# Patient Record
Sex: MALE | Race: WHITE | URBAN - METROPOLITAN AREA
[De-identification: names, ages, dates, MRNs, and addresses within clinical notes are randomized per-mention and may not be internally consistent; named-entity substitution may affect disease eponyms.]

---

## 2019-02-27 ENCOUNTER — OFFICE VISIT (OUTPATIENT)
Dept: FAMILY MEDICINE CLINIC | Age: 39
End: 2019-02-27

## 2019-02-27 VITALS
BODY MASS INDEX: 23.14 KG/M2 | WEIGHT: 200 LBS | HEART RATE: 60 BPM | DIASTOLIC BLOOD PRESSURE: 79 MMHG | OXYGEN SATURATION: 98 % | HEIGHT: 78 IN | RESPIRATION RATE: 8 BRPM | TEMPERATURE: 97.7 F | SYSTOLIC BLOOD PRESSURE: 139 MMHG

## 2019-02-27 DIAGNOSIS — Z13.220 SCREENING FOR LIPID DISORDERS: ICD-10-CM

## 2019-02-27 DIAGNOSIS — R79.89 ELEVATED LFTS: ICD-10-CM

## 2019-02-27 DIAGNOSIS — R10.12 LEFT UPPER QUADRANT PAIN: Primary | ICD-10-CM

## 2019-02-27 NOTE — PROGRESS NOTES
Shyam Salcedo is a 45 y.o. male Patient states over 7 days ago he was experiencing sharp distress to upper left side of abdomen which lasted for 2 weeks. Patient states at that time he was also experiencing indigestion with burping. BM was different for patient at that time in which consisency was soft but tan in color. No problem at this time. Chief Complaint Patient presents with  New Patient [address] [place] [address]  Abdominal Pain  
  x 7 days ago, no complaints at this time 1. Have you been to the ER, urgent care clinic since your last visit? Hospitalized since your last visit? No 
M 
2. Have you seen or consulted any other health care providers outside of the [address] [place] [address] since your last visit? Include any pap smears or colon screening. No 
 
 
Visit Vitals /79 (BP 1 Location: Left arm, BP Patient Position: Sitting) Pulse 60 Temp 97.7 °F (36.5 °C) (Oral) Resp 8 Ht 6' 5.56\" (1.97 m) Wt 200 lb (90.7 kg) SpO2 98% BMI 23.38 kg/m² Health Maintenance Due Topic Date Due  
 DTaP/Tdap/Td series (1 - Tdap) 03/22/2001  Influenza Age 5 to Adult  08/01/2018 Medication Reconciliation completed, changes noted.   Please  Update medication list.

## 2019-02-27 NOTE — PROGRESS NOTES
Family Medicine Initial Office Visit Patient: Velia Desai 1980, 45 y.o., male Encounter Date: 2/27/2019 ASSESSMENT & PLAN 
  ICD-10-CM ICD-9-CM 1. Left upper quadrant pain R10.12 789.02 CBC W/O DIFF  
   METABOLIC PANEL, COMPREHENSIVE 2. Elevated LFTs R94.5 790.6 CBC W/O DIFF  
   METABOLIC PANEL, COMPREHENSIVE 3. Screening for lipid disorders Z13.220 V77.91 LIPID PANEL Orders Placed This Encounter  CBC W/O DIFF  
 LIPID PANEL  
 METABOLIC PANEL, COMPREHENSIVE  
 
LUQ pain no longer present, will check lft b/c hx of elevation in past per his report. DDX is broad -could be viral, gall bladder, gerd, but less likely pancreas (patient reassured)--will notify pt of lab results Encouraged healthy diet, exercise Anticipatory guidance Encouraged flu shot 
reocmmend be utd on tdap F/u annually or as needed CHIEF COMPLAINT Chief Complaint Patient presents with  New Patient [facility] Establish Care  Abdominal Pain  
  x 7 days ago, no complaints at this time SUBJECTIVE Velia Desai is a 45 y.o. male presenting today for establishing care. Patient works as unemployed presently. In the past has done [employer] of things. \" 
Patient lives in a house with mother and sister Patient was last seen by primary care about a year ago--was seeing a doctor at [facility] family medicine. Patient last saw a dentist not within the last few years Patient last had eye exam not within the last few years Exercise: cardio (20 m per day) and sometimes weights Diet / Weight:healthy diet, weight stable. Tobacco:no EtOH:no 
Illicit Substances:no Sexual Activity: no  
 
Had 1-2 wk of tan colored stools, indigestion, belching and soft stools. Never any blood or vomiting. Now symptoms resolved. Were associated with sharp epigastric and LUQ pain.  
 
Changed eating habits last year-gave up coffee because he had to urinate too often and cut back on his lactose consumption--it was giving him a sour stomach and sometimes diarrhea and it is improving his symptoms to limit these items. Mother has lupus, patient donated his kidney to his mom 13 years ago Review of Systems Constitutional: Negative for chills and fever. HENT: Negative. Eyes: Negative for visual disturbance. Respiratory: Negative for shortness of breath. Cardiovascular: Negative for chest pain and leg swelling. Gastrointestinal: Negative for constipation, diarrhea, nausea and vomiting. Endocrine: Negative for polydipsia, polyphagia and polyuria. Genitourinary: Negative for difficulty urinating. Musculoskeletal: Negative for arthralgias and myalgias. Skin: Negative for rash. Allergic/Immunologic: Negative for environmental allergies. Neurological: Negative for seizures, syncope and headaches. Psychiatric/Behavioral: At Baseline, stable All other systems reviewed and are negative. OBJECTIVE Visit Vitals /79 (BP 1 Location: Left arm, BP Patient Position: Sitting) Pulse 60 Temp 97.7 °F (36.5 °C) (Oral) Resp 8 Ht 6' 5.56\" (1.97 m) Wt 200 lb (90.7 kg) SpO2 98% BMI 23.38 kg/m² Physical Exam  
Constitutional: He is oriented to person, place, and time. He appears well-developed and well-nourished. No distress. NAD, Nontoxic, Appears Stated Age, tall HENT:  
Head: Normocephalic and atraumatic. Mouth/Throat: Oropharynx is clear and moist.  
Eyes: Conjunctivae and EOM are normal. Right eye exhibits no discharge. Left eye exhibits no discharge. No scleral icterus. Neck: Neck supple. No bruit Cardiovascular: Normal rate, regular rhythm and normal heart sounds. No murmur heard. Pulmonary/Chest: Effort normal and breath sounds normal. No stridor. No respiratory distress. He has no wheezes. He has no rales. Abdominal: Soft. Bowel sounds are normal. He exhibits no distension.  There is no tenderness. There is no rebound and no guarding. Musculoskeletal: He exhibits no edema or tenderness. Neurological: He is alert and oriented to person, place, and time. Grossly intact CN Skin: Skin is warm and dry. No rash noted. He is not diaphoretic. Psychiatric: He has a normal mood and affect. His behavior is normal.  
Nursing note and vitals reviewed. No results found for any visits on 02/27/19. HISTORICAL Reviewed and updated today, and as noted below: 
 
Past Medical History:  
Diagnosis Date  Spleen injury   
 ruputure repair 1991 Past Surgical History:  
Procedure Laterality Date  ABDOMEN SURGERY PROC UNLISTED    
 ruptured spleen 1991  
 HX RENAL TRANSPLANT  2006  
 donatiin of kidney  HX TRANSPLANT    
 donated kidney to mom --2006 Family History Problem Relation Age of Onset  Lupus Mother  SLE Mother  Hypertension Mother  Endometriosis Sister  Cancer Sister   
     endometrial ca  
 Heart Attack Father Social History Tobacco Use Smoking Status Never Smoker Smokeless Tobacco Never Used Social History Socioeconomic History  Marital status: SINGLE Spouse name: Not on file  Number of children: Not on file  Years of education: Not on file  Highest education level: Not on file Tobacco Use  Smoking status: Never Smoker  Smokeless tobacco: Never Used Substance and Sexual Activity  Alcohol use: No  
  Frequency: Never  Drug use: No  
 Sexual activity: Not Currently No Known Allergies No results found for any previous visit. Christiana Pantoja MD 
[address] 02/27/19 1:52 PM 
 
Portions of this note may have been populated using smart dictation software and may have \"sounds-like\" errors present.

## 2019-03-07 LAB
ALBUMIN SERPL-MCNC: 4.7 G/DL (ref 3.5–5.5)
ALBUMIN/GLOB SERPL: 1.8 {RATIO} (ref 1.2–2.2)
ALP SERPL-CCNC: 61 IU/L (ref 39–117)
ALT SERPL-CCNC: 42 IU/L (ref 0–44)
AST SERPL-CCNC: 28 IU/L (ref 0–40)
BILIRUB SERPL-MCNC: 1.3 MG/DL (ref 0–1.2)
BUN SERPL-MCNC: 14 MG/DL (ref 6–20)
BUN/CREAT SERPL: 16 (ref 9–20)
CALCIUM SERPL-MCNC: 9.3 MG/DL (ref 8.7–10.2)
CHLORIDE SERPL-SCNC: 102 MMOL/L (ref 96–106)
CHOLEST SERPL-MCNC: 171 MG/DL (ref 100–199)
CO2 SERPL-SCNC: 24 MMOL/L (ref 20–29)
CREAT SERPL-MCNC: 0.87 MG/DL (ref 0.76–1.27)
ERYTHROCYTE [DISTWIDTH] IN BLOOD BY AUTOMATED COUNT: 14.4 % (ref 12.3–15.4)
GLOBULIN SER CALC-MCNC: 2.6 G/DL (ref 1.5–4.5)
GLUCOSE SERPL-MCNC: 71 MG/DL (ref 65–99)
HCT VFR BLD AUTO: 44.4 % (ref 37.5–51)
HDLC SERPL-MCNC: 58 MG/DL
HGB BLD-MCNC: 15.1 G/DL (ref 13–17.7)
INTERPRETATION, 910389: NORMAL
LDLC SERPL CALC-MCNC: 98 MG/DL (ref 0–99)
MCH RBC QN AUTO: 28.9 PG (ref 26.6–33)
MCHC RBC AUTO-ENTMCNC: 34 G/DL (ref 31.5–35.7)
MCV RBC AUTO: 85 FL (ref 79–97)
PLATELET # BLD AUTO: 213 X10E3/UL (ref 150–379)
POTASSIUM SERPL-SCNC: 4 MMOL/L (ref 3.5–5.2)
PROT SERPL-MCNC: 7.3 G/DL (ref 6–8.5)
RBC # BLD AUTO: 5.22 X10E6/UL (ref 4.14–5.8)
SODIUM SERPL-SCNC: 143 MMOL/L (ref 134–144)
TRIGL SERPL-MCNC: 77 MG/DL (ref 0–149)
VLDLC SERPL CALC-MCNC: 15 MG/DL (ref 5–40)
WBC # BLD AUTO: 6.4 X10E3/UL (ref 3.4–10.8)

## 2019-03-08 NOTE — PROGRESS NOTES
Blood count and cholesterol normal  Electrolytes, liver and kidney function all ok  Very mild elevation in a liver test called bilirubin, this is likely a genetic variation that 5-8% of the population has, not concerning given other normal testing  Keep up the good work